# Patient Record
Sex: MALE | Race: WHITE | NOT HISPANIC OR LATINO | URBAN - METROPOLITAN AREA
[De-identification: names, ages, dates, MRNs, and addresses within clinical notes are randomized per-mention and may not be internally consistent; named-entity substitution may affect disease eponyms.]

---

## 2020-10-14 ENCOUNTER — EMERGENCY (EMERGENCY)
Facility: HOSPITAL | Age: 50
LOS: 1 days | Discharge: HOME | End: 2020-10-14
Admitting: EMERGENCY MEDICINE
Payer: COMMERCIAL

## 2020-10-14 VITALS
OXYGEN SATURATION: 97 % | RESPIRATION RATE: 18 BRPM | HEART RATE: 63 BPM | SYSTOLIC BLOOD PRESSURE: 167 MMHG | TEMPERATURE: 98 F | DIASTOLIC BLOOD PRESSURE: 93 MMHG

## 2020-10-14 VITALS
TEMPERATURE: 98 F | WEIGHT: 179.9 LBS | SYSTOLIC BLOOD PRESSURE: 177 MMHG | OXYGEN SATURATION: 98 % | RESPIRATION RATE: 18 BRPM | DIASTOLIC BLOOD PRESSURE: 110 MMHG | HEART RATE: 74 BPM

## 2020-10-14 DIAGNOSIS — S01.21XA LACERATION WITHOUT FOREIGN BODY OF NOSE, INITIAL ENCOUNTER: ICD-10-CM
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
## 2020-10-14 DIAGNOSIS — Z23 ENCOUNTER FOR IMMUNIZATION: ICD-10-CM
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
## 2020-10-14 DIAGNOSIS — S02.2XXA FRACTURE OF NASAL BONES, INITIAL ENCOUNTER FOR CLOSED FRACTURE: ICD-10-CM
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
## 2020-10-14 PROCEDURE — 99284 EMERGENCY DEPT VISIT MOD MDM: CPT

## 2020-10-14 PROCEDURE — 70486 CT MAXILLOFACIAL W/O DYE: CPT | Mod: 26

## 2020-10-14 RX ORDER — TETANUS TOXOID, REDUCED DIPHTHERIA TOXOID AND ACELLULAR PERTUSSIS VACCINE, ADSORBED 5; 2.5; 8; 8; 2.5 [IU]/.5ML; [IU]/.5ML; UG/.5ML; UG/.5ML; UG/.5ML
0.5 SUSPENSION INTRAMUSCULAR ONCE
Refills: 0 | Status: COMPLETED | OUTPATIENT
Start: 2020-10-14 | End: 2020-10-14

## 2020-10-14 RX ORDER — ACETAMINOPHEN 500 MG
650 TABLET ORAL ONCE
Refills: 0 | Status: COMPLETED | OUTPATIENT
Start: 2020-10-14 | End: 2020-10-14

## 2020-10-14 RX ORDER — IBUPROFEN 200 MG
600 TABLET ORAL ONCE
Refills: 0 | Status: COMPLETED | OUTPATIENT
Start: 2020-10-14 | End: 2020-10-14

## 2020-10-14 RX ORDER — MOXIFLOXACIN HYDROCHLORIDE TABLETS, 400 MG 400 MG/1
1 TABLET, FILM COATED ORAL
Qty: 10 | Refills: 0
Start: 2020-10-14 | End: 2020-10-18

## 2020-10-14 RX ORDER — CIPROFLOXACIN LACTATE 400MG/40ML
500 VIAL (ML) INTRAVENOUS ONCE
Refills: 0 | Status: COMPLETED | OUTPATIENT
Start: 2020-10-14 | End: 2020-10-14

## 2020-10-14 RX ADMIN — Medication 600 MILLIGRAM(S): at 16:48

## 2020-10-14 RX ADMIN — Medication 650 MILLIGRAM(S): at 16:23

## 2020-10-14 RX ADMIN — TETANUS TOXOID, REDUCED DIPHTHERIA TOXOID AND ACELLULAR PERTUSSIS VACCINE, ADSORBED 0.5 MILLILITER(S): 5; 2.5; 8; 8; 2.5 SUSPENSION INTRAMUSCULAR at 17:22

## 2020-10-14 RX ADMIN — Medication 600 MILLIGRAM(S): at 16:23

## 2020-10-14 RX ADMIN — Medication 650 MILLIGRAM(S): at 16:47

## 2020-10-14 RX ADMIN — Medication 500 MILLIGRAM(S): at 19:47

## 2020-10-14 NOTE — ED PROVIDER NOTE - PATIENT PORTAL LINK FT
You can access the FollowMyHealth Patient Portal offered by [facility] by registering at the following website: [email or web address]. By joining [facility]’s FollowMyHealth portal, you will also be able to view your health information using other applications (apps) compatible with our system.
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
## 2020-10-14 NOTE — ED PROVIDER NOTE - NSFOLLOWUPINSTRUCTIONS_ED_ALL_ED_FT
Laceration    A laceration is a cut that goes through all of the layers of the skin and into the tissue that is right under the skin. Some lacerations heal on their own. Others need to be closed with skin adhesive strips, skin glue, stitches (sutures), or staples. Proper laceration care minimizes the risk of infection and helps the laceration to heal better.  If non-absorbable stitches or staples have been placed, they must be taken out within the time frame instructed by your healthcare provider.    SEEK IMMEDIATE MEDICAL CARE IF YOU HAVE ANY OF THE FOLLOWING SYMPTOMS: swelling around the wound, worsening pain, drainage from the wound, red streaking going away from your wound, inability to move finger or toe near the laceration, or discoloration of skin near the laceration.  Contusion    A contusion is a deep bruise. Contusions are the result of a blunt injury to tissues and muscle fibers under the skin. The skin overlying the contusion may turn blue, purple, or yellow. Symptoms also include pain and swelling in the injured area.    SEEK IMMEDIATE MEDICAL CARE IF YOU HAVE ANY OF THE FOLLOWING SYMPTOMS: severe pain, numbness, tingling, pain, weakness, or skin color/temperature change in any part of your body distal to the injury.  What is a concussion?  A concussion is a mild brain injury that can cause confusion, memory loss, and headache. Sometimes people pass out (lose consciousness) when they have a concussion, but not always.    A concussion can happen after a person has an injury to the head from being hit or falling.    What are the symptoms of a concussion?  Symptoms that can happen minutes to hours after a concussion include:    ?Memory loss – People sometimes forget what caused their injury, as well as what happened right before and after the injury.    ?Confusion    ?Headache    ?Dizziness or trouble with balance    ?Nausea or vomiting    ?Feeling sleepy    ?Acting cranky, strangely, or out of sorts    Symptoms that can happen hours to days after a concussion include:    ?Trouble walking or talking    ?Memory problems or problems paying attention    ?Trouble sleeping    ?Mood or behavior changes    ?Vision changes    ?Being bothered by noise or light    Will I need tests?  It depends on your injury and symptoms. To check if you have a concussion, your doctor will ask about your symptoms and do an exam. He or she will also ask you questions to check that you are thinking clearly.    If your doctor suspects a serious injury, he or she might order an imaging test of the brain, such as a CT or MRI scan. These tests create pictures of the skull and inside of the brain.    How is a concussion treated?  A concussion does not usually need treatment. Most concussions get better on their own, but it can take time. Some people's symptoms go away within minutes to hours. Other people have symptoms for weeks to months. When symptoms last a long time, doctors call it "postconcussion syndrome."    After your concussion, your doctor might recommend that someone watch you for 12 to 24 hours. This person should watch for symptoms.    To help your brain heal after a concussion, you can:    ?Rest your body – Make sure to get plenty of sleep. Avoid heavy exercise or too much physical activity if it makes you feel worse.    ?Rest your brain – Avoid doing activities that need concentration or a lot of attention if they make you feel worse.    ?Not drink alcohol while you are still having symptoms of concussion    ?Take a pain-relieving medicine, if you have a headache – You can choose one with acetaminophen (sample brand name: Tylenol) or ibuprofen (sample brand names: Advil, Motrin).    When can I play sports or do my usual activities again?  Ask your doctor when you can play sports or do your usual activities again. It will depend on your injury and symptoms, as well as the type of sport you play. Do not go back to playing on the same day as your injury.    It's important to let your brain heal completely after a concussion. Getting another concussion before your brain has healed may lead to serious brain problems.    When should I call the doctor or nurse?  Call the doctor or nurse if any of the following happen after a concussion:    ?You vomit more than 3 times    ?You have a severe headache, or a headache that gets worse    ?You have a seizure    ?You have trouble walking or talking    ?Your vision changes    ?You feel weak or numb in part of your body    ?You lose control over your bladder or bowel    If your doctor suggested that someone watch you after your concussion, this person should call the doctor or nurse if he or she:    ?Can't wake you up    ?Sees any of the symptoms listed above    How can I prevent another concussion?  To help prevent another concussion, you can:    ?Wear a helmet when you ride a bike or motorcycle, or play certain sports    ?Wear a seat belt when you drive or ride in a car    If you have one concussion, it's very important to try to prevent future concussions. Having many concussions might cause long-term brain damage and affect your thinking.

## 2020-10-14 NOTE — ED PROVIDER NOTE - PHYSICAL EXAMINATION
1 cm stellate lac to left side on nasal bridge   1 cm linear lac to right side.   deformed nasal bridge suspect fracture  no observable septal hematoma   dried blood in nares
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
## 2020-10-14 NOTE — ED PROVIDER NOTE - CHPI ED SYMPTOMS NEG
no tingling/no weakness/no nausea/no numbness/no dizziness/no fever/no vomiting/no pain/no chills/no decreased eating/drinking

## 2020-10-14 NOTE — ED PROVIDER NOTE - CARE PROVIDER_API CALL
Matthias Sexton)  Plastic Surgery Surgery  [address], [address]  [place] [address]  Phone: [phone]  Fax: [phone]  Follow Up Time: 1-3 Days

## 2020-10-17 DIAGNOSIS — S01.81XA LACERATION WITHOUT FOREIGN BODY OF OTHER PART OF HEAD, INITIAL ENCOUNTER: ICD-10-CM

## 2020-10-17 DIAGNOSIS — S02.401A MAXILLARY FRACTURE, UNSPECIFIED SIDE, INITIAL ENCOUNTER FOR CLOSED FRACTURE: ICD-10-CM

## 2020-10-17 DIAGNOSIS — S02.2XXB FRACTURE OF NASAL BONES, INITIAL ENCOUNTER FOR OPEN FRACTURE: ICD-10-CM

## 2020-10-17 DIAGNOSIS — S03.8XXA SPRAIN OF JOINTS AND LIGAMENTS OF OTHER PARTS OF HEAD, INITIAL ENCOUNTER: ICD-10-CM
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
## 2020-10-17 NOTE — CONSULT NOTE ADULT - ASSESSMENT
Open nasal and septal fracture with skeletal instability, caudal septum disassociated from maxilla , septal hematoma

## 2020-10-17 NOTE — PROCEDURE NOTE - ADDITIONAL PROCEDURE DETAILS
On exam, there are two lacerations, the first is at the proximal middle third and overlies the distal nasal bones.  The laceration sits over the midline and favors the left lateral aspect.  Procerus muscle is split with the skin and the left nasal bone is exposed beneath.  This laceration is approximately 1.3 cm in length.  There is a second laceration on the right lateral sidewall of the nose that is full thickness and there is injury to nasalis muscle beneath. This laceration is approximately 1.6 cm in length and curvilinear in nature.   The patient was given Afrin and both supraorbital and inferior orbital blocks were performed with 1% lidocaine with epinephrine.   Lacerations were closed with a 5-0 vicryl to approximate muscle and deep dermis.  Then a combination of interrupted and running 5-0 fast gut sutures were used to approximate and repair the skin edges.  On anterior rhinoscopy, there is minimal patency of the right nasal passage compared to the left.  A bluish discoloration of the septum is immediately apparent on inspection of the right nasal cavity.  1% lidocaine with epinephrine was infiltrated into the mucoperiosteum of the septum and an 11 blade was used to incise the mucoperiosteum to allow for drainage of hematoma.  The distance was measured from the nares to the nasal bones.  Using the scalpel handle as an elevator, the elevator was inserted into each nare to the distance previously measured and the nasal bone fractures were each reduced with an outward force on the elevator and countertraction on the skin envelope.  The footplate of the caudal septum was highly mobile due to anterior nasal spine fracture.  A 4-0 chromic suture was used to affix the footplate to the periosteum of the maxilla at the location of the anterior nasal spine to create distal stability.  An external splint was applied to the nasal bones.

## 2024-01-30 PROBLEM — Z00.00 ENCOUNTER FOR PREVENTIVE HEALTH EXAMINATION: Status: ACTIVE | Noted: 2024-01-30

## 2024-01-31 PROBLEM — Z78.9 OTHER SPECIFIED HEALTH STATUS: Chronic | Status: ACTIVE | Noted: 2020-10-14

## 2024-02-01 ENCOUNTER — APPOINTMENT (OUTPATIENT)
Dept: GASTROENTEROLOGY | Facility: CLINIC | Age: 54
End: 2024-02-01
Payer: COMMERCIAL

## 2024-02-01 VITALS
DIASTOLIC BLOOD PRESSURE: 68 MMHG | SYSTOLIC BLOOD PRESSURE: 110 MMHG | BODY MASS INDEX: 24.78 KG/M2 | OXYGEN SATURATION: 98 % | WEIGHT: 177 LBS | TEMPERATURE: 96.6 F | HEIGHT: 71 IN | RESPIRATION RATE: 16 BRPM | HEART RATE: 45 BPM

## 2024-02-01 PROCEDURE — 99203 OFFICE O/P NEW LOW 30 MIN: CPT

## 2024-02-05 NOTE — HISTORY OF PRESENT ILLNESS
[FreeTextEntry1] : 53M with no pertinent PMHx referred by Dr. Altaf Palmer for colon cancer screening.   HPI- no GI complaints   Edwards stool score- Type 4 Colon cancer screening- never  Referred by- Dr. Clyde Palmer   PMHx- hx of HLD  PSHx-none  Rx- none  Supplements/herbs/OTC- none  A/c or NSAIDs? advil with exercise  FHx- father with skin cancer, family hx of polyps unsure type  Allergies- NKDA  ETOH- 2-3 drinks per day  Smoking- former, quit 10 years ago  Drugs- none   Labs/stool tests- November 2023 with PCP  Breath tests- none Imaging- none EGD- never Colonoscopy- never

## 2024-02-05 NOTE — ASSESSMENT
[FreeTextEntry1] : 53M with no pertinent PMHx referred by Dr. Altaf Palmer for colon cancer screening.   Colon cancer screening  - schedule patient for colonoscopy @ [facility], r/a/i/b discussed and patient agreeable  - bowel prep instructions to be provided, [facility]  - informed patient escort must pick patient up from procedure   f/u post colonoscopy

## 2024-04-11 ENCOUNTER — APPOINTMENT (OUTPATIENT)
Age: 54
End: 2024-04-11
Payer: COMMERCIAL

## 2024-04-11 ENCOUNTER — RESULT REVIEW (OUTPATIENT)
Age: 54
End: 2024-04-11

## 2024-04-11 PROCEDURE — 45380 COLONOSCOPY AND BIOPSY: CPT | Mod: 33,59

## 2024-04-11 PROCEDURE — 45385 COLONOSCOPY W/LESION REMOVAL: CPT | Mod: 33

## 2024-05-06 ENCOUNTER — NON-APPOINTMENT (OUTPATIENT)
Age: 54
End: 2024-05-06

## 2024-05-10 ENCOUNTER — NON-APPOINTMENT (OUTPATIENT)
Age: 54
End: 2024-05-10